# Patient Record
Sex: FEMALE | Race: WHITE | URBAN - METROPOLITAN AREA
[De-identification: names, ages, dates, MRNs, and addresses within clinical notes are randomized per-mention and may not be internally consistent; named-entity substitution may affect disease eponyms.]

---

## 2019-09-24 NOTE — PATIENT DISCUSSION
Continue: moxifloxacin (moxifloxacin): drops: 0.5% 1 drop three times a day as directed into left eye

## 2021-01-08 ENCOUNTER — IMPORTED ENCOUNTER (OUTPATIENT)
Dept: URBAN - METROPOLITAN AREA CLINIC 9 | Facility: CLINIC | Age: 71
End: 2021-01-08

## 2021-03-04 ENCOUNTER — IMPORTED ENCOUNTER (OUTPATIENT)
Dept: URBAN - METROPOLITAN AREA CLINIC 9 | Facility: CLINIC | Age: 71
End: 2021-03-04

## 2021-07-27 ENCOUNTER — IMPORTED ENCOUNTER (OUTPATIENT)
Dept: URBAN - METROPOLITAN AREA CLINIC 9 | Facility: CLINIC | Age: 71
End: 2021-07-27

## 2021-10-16 ASSESSMENT — VISUAL ACUITY
OD_SC: 20/50 SN
OD_SC: 20/70 SN
OS_SC: 20/60 SN
OS_SC: 20/60 SN
OD_SC: 20/50 SN
OS_SC: 20/60 SN

## 2022-06-18 RX ORDER — BETA-CAROTENE 7500 MCG
CAPSULE ORAL
COMMUNITY

## 2022-06-18 RX ORDER — FEXOFENADINE HCL 180 MG/1
TABLET ORAL
COMMUNITY

## 2022-06-18 RX ORDER — QUETIAPINE FUMARATE 25 MG/1
TABLET, FILM COATED ORAL
COMMUNITY

## 2022-06-18 RX ORDER — ACYCLOVIR 50 MG/G
CREAM TOPICAL
COMMUNITY

## 2022-06-18 RX ORDER — PREDNISOLONE ACETATE 10 MG/ML
SUSPENSION/ DROPS OPHTHALMIC
COMMUNITY